# Patient Record
Sex: FEMALE | Race: BLACK OR AFRICAN AMERICAN | URBAN - METROPOLITAN AREA
[De-identification: names, ages, dates, MRNs, and addresses within clinical notes are randomized per-mention and may not be internally consistent; named-entity substitution may affect disease eponyms.]

---

## 2020-07-07 ENCOUNTER — OFFICE VISIT (OUTPATIENT)
Dept: URBAN - METROPOLITAN AREA TELEHEALTH 2 | Facility: TELEHEALTH | Age: 70
End: 2020-07-07
Payer: MEDICARE

## 2020-07-07 ENCOUNTER — LAB OUTSIDE AN ENCOUNTER (OUTPATIENT)
Dept: URBAN - METROPOLITAN AREA TELEHEALTH 2 | Facility: TELEHEALTH | Age: 70
End: 2020-07-07

## 2020-07-07 DIAGNOSIS — K21.0 GASTROESOPHAGEAL REFLUX DISEASE WITH ESOPHAGITIS: ICD-10-CM

## 2020-07-07 DIAGNOSIS — R14.0 BLOATING: ICD-10-CM

## 2020-07-07 DIAGNOSIS — K57.90 DIVERTICULOSIS: ICD-10-CM

## 2020-07-07 DIAGNOSIS — K44.9 HIATAL HERNIA: ICD-10-CM

## 2020-07-07 DIAGNOSIS — K57.30 ACQUIRED DIVERTICULOSIS OF COLON: ICD-10-CM

## 2020-07-07 PROCEDURE — 1036F TOBACCO NON-USER: CPT | Performed by: INTERNAL MEDICINE

## 2020-07-07 PROCEDURE — 99214 OFFICE O/P EST MOD 30 MIN: CPT | Performed by: INTERNAL MEDICINE

## 2020-07-07 PROCEDURE — 3017F COLORECTAL CA SCREEN DOC REV: CPT | Performed by: INTERNAL MEDICINE

## 2020-07-07 PROCEDURE — G8428 CUR MEDS NOT DOCUMENT: HCPCS | Performed by: INTERNAL MEDICINE

## 2020-07-07 PROCEDURE — G9903 PT SCRN TBCO ID AS NON USER: HCPCS | Performed by: INTERNAL MEDICINE

## 2020-07-07 PROCEDURE — G8419 CALC BMI OUT NRM PARAM NOF/U: HCPCS | Performed by: INTERNAL MEDICINE

## 2020-07-07 RX ORDER — DICYCLOMINE HYDROCHLORIDE 10 MG/1
1 TABLET CAPSULE ORAL THREE TIMES A DAY
Qty: 90 | Refills: 1 | OUTPATIENT
Start: 2020-07-07 | End: 2020-09-05

## 2020-07-07 RX ORDER — AMLODIPINE BESYLATE 5 MG/1
1 TABLET TABLET ORAL ONCE A DAY
Status: ACTIVE | COMMUNITY

## 2020-07-07 RX ORDER — ESOMEPRAZOLE MAGNESIUM 40 MG
TAKE 1 CAPSULE (40 MG) BY ORAL ROUTE ONCE DAILY CAPSULE,DELAYED RELEASE (ENTERIC COATED) ORAL 1
Qty: 0 | Refills: 0 | Status: DISCONTINUED | COMMUNITY
Start: 1900-01-01

## 2020-07-07 RX ORDER — SIMVASTATIN 40 MG/1
TAKE 1 TABLET (40 MG) BY ORAL ROUTE ONCE DAILY IN THE EVENING TABLET, FILM COATED ORAL 1
Qty: 0 | Refills: 0 | Status: DISCONTINUED | COMMUNITY
Start: 1900-01-01

## 2020-07-07 RX ORDER — PANTOPRAZOLE SODIUM 40 MG/1
1 TABLET TABLET, DELAYED RELEASE ORAL ONCE A DAY
Qty: 90 | Refills: 1 | OUTPATIENT
Start: 2020-07-07

## 2020-07-07 NOTE — HPI-TODAY'S VISIT:
69 years old pleasant -American female was seen by me at [facility] today.  She denies have any abdominal pain but does complain of bloating she has symptoms are getting worse from past few months she takes the Gas-X without any success.  She does complain of heartburn and indigestion which is not also under control she stopped taking the Nexium many years ago.  She did have a weight gain.  She also history of hiatal hernia she thinks that was bothering her now.  Denies of any melena or hematochezia or NSAID use.  She does drink lots of coffee.

## 2020-07-30 ENCOUNTER — OFFICE VISIT (OUTPATIENT)
Dept: URBAN - METROPOLITAN AREA SURGERY CENTER 13 | Facility: SURGERY CENTER | Age: 70
End: 2020-07-30
Payer: MEDICARE

## 2020-07-30 ENCOUNTER — CLAIMS CREATED FROM THE CLAIM WINDOW (OUTPATIENT)
Dept: URBAN - METROPOLITAN AREA CLINIC 4 | Facility: CLINIC | Age: 70
End: 2020-07-30
Payer: MEDICARE

## 2020-07-30 DIAGNOSIS — K31.89 ACQUIRED DEFORMITY OF PYLORUS: ICD-10-CM

## 2020-07-30 DIAGNOSIS — K21.9 ACID REFLUX: ICD-10-CM

## 2020-07-30 DIAGNOSIS — K21.0 GASTRO-ESOPHAGEAL REFLUX DISEASE WITH ESOPHAGITIS: ICD-10-CM

## 2020-07-30 DIAGNOSIS — K29.60 OTHER GASTRITIS WITHOUT BLEEDING: ICD-10-CM

## 2020-07-30 PROCEDURE — 43239 EGD BIOPSY SINGLE/MULTIPLE: CPT | Performed by: INTERNAL MEDICINE

## 2020-07-30 PROCEDURE — 88305 TISSUE EXAM BY PATHOLOGIST: CPT | Performed by: PATHOLOGY

## 2020-07-30 PROCEDURE — G8907 PT DOC NO EVENTS ON DISCHARG: HCPCS | Performed by: INTERNAL MEDICINE

## 2020-07-30 PROCEDURE — 88312 SPECIAL STAINS GROUP 1: CPT | Performed by: PATHOLOGY

## 2020-07-30 RX ORDER — AMLODIPINE BESYLATE 5 MG/1
1 TABLET TABLET ORAL ONCE A DAY
Status: ACTIVE | COMMUNITY

## 2020-07-30 RX ORDER — PANTOPRAZOLE SODIUM 40 MG/1
1 TABLET TABLET, DELAYED RELEASE ORAL ONCE A DAY
Qty: 90 | Refills: 1 | Status: ACTIVE | COMMUNITY
Start: 2020-07-07

## 2020-07-30 RX ORDER — SUCRALFATE 1 G/1
1 TABLET ON AN EMPTY STOMACH TABLET ORAL TWICE A DAY
Qty: 60 TABLET | Refills: 1 | OUTPATIENT
Start: 2020-07-30 | End: 2020-09-28

## 2020-07-30 RX ORDER — OMEPRAZOLE 40 MG/1
1 CAPSULE 30 MINUTES BEFORE MORNING MEAL CAPSULE, DELAYED RELEASE ORAL ONCE A DAY
Qty: 90 | Refills: 1 | OUTPATIENT
Start: 2020-07-30

## 2020-07-30 RX ORDER — DICYCLOMINE HYDROCHLORIDE 10 MG/1
1 TABLET CAPSULE ORAL THREE TIMES A DAY
Qty: 90 | Refills: 1 | Status: ACTIVE | COMMUNITY
Start: 2020-07-07 | End: 2020-09-05

## 2021-06-22 ENCOUNTER — WEB ENCOUNTER (OUTPATIENT)
Dept: URBAN - METROPOLITAN AREA CLINIC 82 | Facility: CLINIC | Age: 71
End: 2021-06-22

## 2021-06-22 ENCOUNTER — OFFICE VISIT (OUTPATIENT)
Dept: URBAN - METROPOLITAN AREA CLINIC 82 | Facility: CLINIC | Age: 71
End: 2021-06-22
Payer: MEDICARE

## 2021-06-22 DIAGNOSIS — K21.00 CHRONIC REFLUX ESOPHAGITIS: ICD-10-CM

## 2021-06-22 DIAGNOSIS — B17.10 HEPATITIS C: ICD-10-CM

## 2021-06-22 DIAGNOSIS — K44.9 HIATAL HERNIA: ICD-10-CM

## 2021-06-22 DIAGNOSIS — K58.8 OTHER IRRITABLE BOWEL SYNDROME: ICD-10-CM

## 2021-06-22 PROCEDURE — G8417 CALC BMI ABV UP PARAM F/U: HCPCS | Performed by: INTERNAL MEDICINE

## 2021-06-22 PROCEDURE — G8427 DOCREV CUR MEDS BY ELIG CLIN: HCPCS | Performed by: INTERNAL MEDICINE

## 2021-06-22 PROCEDURE — 1036F TOBACCO NON-USER: CPT | Performed by: INTERNAL MEDICINE

## 2021-06-22 PROCEDURE — 99214 OFFICE O/P EST MOD 30 MIN: CPT | Performed by: INTERNAL MEDICINE

## 2021-06-22 PROCEDURE — G9903 PT SCRN TBCO ID AS NON USER: HCPCS | Performed by: INTERNAL MEDICINE

## 2021-06-22 RX ORDER — AMLODIPINE BESYLATE 5 MG/1
1 TABLET TABLET ORAL ONCE A DAY
Status: ACTIVE | COMMUNITY

## 2021-06-22 RX ORDER — OMEPRAZOLE 40 MG/1
1 CAPSULE 30 MINUTES BEFORE MORNING MEAL CAPSULE, DELAYED RELEASE ORAL ONCE A DAY
Qty: 90 | Refills: 1 | Status: ACTIVE | COMMUNITY
Start: 2020-07-30

## 2021-06-22 RX ORDER — DICYCLOMINE HYDROCHLORIDE 10 MG/1
1 TABLET CAPSULE ORAL THREE TIMES A DAY
Qty: 90 | Refills: 1 | OUTPATIENT
Start: 2021-06-22 | End: 2021-08-21

## 2021-06-22 RX ORDER — PANTOPRAZOLE SODIUM 40 MG/1
1 TABLET TABLET, DELAYED RELEASE ORAL ONCE A DAY
Qty: 90 | Refills: 1 | Status: DISCONTINUED | COMMUNITY
Start: 2020-07-07

## 2021-06-22 NOTE — HPI-OTHER HISTORIES
The patient is a 67 year old /Black female, who presents on referral from [facility], for a gastroenterology evaluation for abdominal pain. A copy of this document will be sent to the referring provider.   12/23/2016 The patient has described the pain as mild to moderate and dull and colicky occurring intermittently, and lasts minutes at a time. The pain has remained unchanged since it started. The location of the pain is diffusely in the abdomen and has been present for the past 3 months. The pain is improved by no particular treatment and is aggravated by no particular treatment. The pain is not reproducible with palpation over the area.  Patient has no history of GERD and abdominal pain.  The patient admits to no other complaints.    09/07/2017 Patient still complains of abdominal pain and bloating. She also complains of acid reflux for which she takes Nexium with occasional breakthrough symptoms. Patient also takes gas-x. She did not fill Levsin. She admits to a significant amount of stress at the moment. Her last colonoscopy was in 2013. Denies constipation.

## 2021-06-22 NOTE — HPI-TODAY'S VISIT:
07/07/2020 69 years old pleasant -American female was seen by me at [facility] today.  She denies have any abdominal pain but does complain of bloating she has symptoms are getting worse from past few months she takes the Gas-X without any success.  She does complain of heartburn and indigestion which is not also under control she stopped taking the Nexium many years ago.  She did have a weight gain.  She also history of hiatal hernia she thinks that was bothering her now.  Denies of any melena or hematochezia or NSAID use.  She does drink lots of coffee.  06/22/2021 Patient presents for a follow up office visit for evaluation of GERD. EGD on 07/30/2020 showed esophagitis, gastritis, small hiatal hernia. Biopsy showed gastritis, no H pylori. Patient is still experiencing intermittent acid reflux with flare ups that cause severe epigastric pain and bloating. She has been routinely taking omeprazole 40mg and sucralfate which controls her symptoms somewhat. She denies any constipation or diarrhea. She has been exercising and trying to lose weight. She currently avoids beans, tomatoes, cabbage, and broccoli in her diet. Patient admits she drinks coffee but she has been trying to decrease the intake per day. Her previous colonoscopy was 2 years ago. Patient admits that she experiences anxiety and she does not sleep well. Patient is also requesting Hep C labs as labs from her PCP are returning positive.

## 2021-06-24 LAB
HCV GENOTYPE: (no result)
HCV LOG10: (no result)
HEPATITIS C QUANTITATION: (no result)
TEST INFORMATION:: (no result)

## 2021-07-13 ENCOUNTER — OFFICE VISIT (OUTPATIENT)
Dept: URBAN - METROPOLITAN AREA CLINIC 82 | Facility: CLINIC | Age: 71
End: 2021-07-13

## 2021-07-13 VITALS — HEIGHT: 64 IN

## 2021-07-13 RX ORDER — DICYCLOMINE HYDROCHLORIDE 10 MG/1
1 TABLET CAPSULE ORAL THREE TIMES A DAY
Qty: 90 | Refills: 1 | Status: ACTIVE | COMMUNITY
Start: 2021-06-22 | End: 2021-08-21

## 2021-07-13 RX ORDER — AMLODIPINE BESYLATE 5 MG/1
1 TABLET TABLET ORAL ONCE A DAY
Status: ACTIVE | COMMUNITY

## 2021-07-13 RX ORDER — OMEPRAZOLE 40 MG/1
1 CAPSULE 30 MINUTES BEFORE MORNING MEAL CAPSULE, DELAYED RELEASE ORAL ONCE A DAY
Qty: 90 | Refills: 1 | Status: ACTIVE | COMMUNITY
Start: 2020-07-30

## 2021-08-18 ENCOUNTER — OFFICE VISIT (OUTPATIENT)
Dept: URBAN - METROPOLITAN AREA CLINIC 82 | Facility: CLINIC | Age: 71
End: 2021-08-18

## 2021-08-24 ENCOUNTER — ERX REFILL RESPONSE (OUTPATIENT)
Dept: URBAN - METROPOLITAN AREA CLINIC 82 | Facility: CLINIC | Age: 71
End: 2021-08-24

## 2021-08-24 RX ORDER — OMEPRAZOLE 40 MG/1
1 CAPSULE 30 MINUTES BEFORE MORNING MEAL CAPSULE, DELAYED RELEASE ORAL ONCE A DAY
Qty: 90 | Refills: 1 | OUTPATIENT

## 2021-08-24 RX ORDER — OMEPRAZOLE 40 MG/1
TAKE 1 CAPSULE BY MOUTH EVERY DAY 30 MINUTES BEFORE BREAKFAST CAPSULE, DELAYED RELEASE ORAL
Qty: 90 CAPSULE | Refills: 1 | OUTPATIENT

## 2021-10-14 ENCOUNTER — LAB OUTSIDE AN ENCOUNTER (OUTPATIENT)
Dept: URBAN - METROPOLITAN AREA CLINIC 115 | Facility: CLINIC | Age: 71
End: 2021-10-14

## 2021-10-14 ENCOUNTER — OFFICE VISIT (OUTPATIENT)
Dept: URBAN - METROPOLITAN AREA CLINIC 115 | Facility: CLINIC | Age: 71
End: 2021-10-14
Payer: MEDICARE

## 2021-10-14 DIAGNOSIS — K44.9 HIATAL HERNIA: ICD-10-CM

## 2021-10-14 DIAGNOSIS — K21.0 GASTROESOPHAGEAL REFLUX DISEASE WITH ESOPHAGITIS: ICD-10-CM

## 2021-10-14 DIAGNOSIS — K58.9 IBS (IRRITABLE BOWEL SYNDROME): ICD-10-CM

## 2021-10-14 DIAGNOSIS — R14.3 GAS AND BLOATING: ICD-10-CM

## 2021-10-14 DIAGNOSIS — B17.10 HEPATITIS C: ICD-10-CM

## 2021-10-14 PROCEDURE — G8427 DOCREV CUR MEDS BY ELIG CLIN: HCPCS | Performed by: INTERNAL MEDICINE

## 2021-10-14 PROCEDURE — 91065 BREATH HYDROGEN/METHANE TEST: CPT | Performed by: INTERNAL MEDICINE

## 2021-10-14 PROCEDURE — G9903 PT SCRN TBCO ID AS NON USER: HCPCS | Performed by: INTERNAL MEDICINE

## 2021-10-14 PROCEDURE — G8417 CALC BMI ABV UP PARAM F/U: HCPCS | Performed by: INTERNAL MEDICINE

## 2021-10-14 PROCEDURE — 99214 OFFICE O/P EST MOD 30 MIN: CPT | Performed by: INTERNAL MEDICINE

## 2021-10-14 RX ORDER — OMEPRAZOLE 40 MG/1
TAKE 1 CAPSULE BY MOUTH EVERY DAY 30 MINUTES BEFORE BREAKFAST CAPSULE, DELAYED RELEASE ORAL
Qty: 90 CAPSULE | Refills: 1 | Status: ACTIVE | COMMUNITY

## 2021-10-14 RX ORDER — AMLODIPINE BESYLATE 5 MG/1
1 TABLET TABLET ORAL ONCE A DAY
Status: ACTIVE | COMMUNITY

## 2021-10-14 NOTE — HPI-OTHER HISTORIES
The patient is a 67 year old /Black female, who presents on referral from [facility], for a gastroenterology evaluation for abdominal pain. A copy of this document will be sent to the referring provider.   12/23/2016 The patient has described the pain as mild to moderate and dull and colicky occurring intermittently, and lasts minutes at a time. The pain has remained unchanged since it started. The location of the pain is diffusely in the abdomen and has been present for the past 3 months. The pain is improved by no particular treatment and is aggravated by no particular treatment. The pain is not reproducible with palpation over the area.  Patient has no history of GERD and abdominal pain.  The patient admits to no other complaints.    09/07/2017 Patient still complains of abdominal pain and bloating. She also complains of acid reflux for which she takes Nexium with occasional breakthrough symptoms. Patient also takes gas-x. She did not fill Levsin. She admits to a significant amount of stress at the moment. Her last colonoscopy was in 2013. Denies constipation.   Influenza - 10/2020

## 2021-10-14 NOTE — HPI-TODAY'S VISIT:
07/07/2020 69 years old pleasant -American female was seen by me at [facility] today.  She denies have any abdominal pain but does complain of bloating she has symptoms are getting worse from past few months she takes the Gas-X without any success.  She does complain of heartburn and indigestion which is not also under control she stopped taking the Nexium many years ago.  She did have a weight gain.  She also history of hiatal hernia she thinks that was bothering her now.  Denies of any melena or hematochezia or NSAID use.  She does drink lots of coffee.  06/22/2021 Patient presents for a follow up office visit for evaluation of GERD. EGD on 07/30/2020 showed esophagitis, gastritis, small hiatal hernia. Biopsy showed gastritis, no H pylori. Patient is still experiencing intermittent acid reflux with flare ups that cause severe epigastric pain and bloating. She has been routinely taking omeprazole 40mg and sucralfate which controls her symptoms somewhat. She denies any constipation or diarrhea. She has been exercising and trying to lose weight. She currently avoids beans, tomatoes, cabbage, and broccoli in her diet. Patient admits she drinks coffee but she has been trying to decrease the intake per day. Her previous colonoscopy was 2 years ago. Patient admits that she experiences anxiety and she does not sleep well. Patient is also requesting Hep C labs as labs from her PCP are returning positive.   10/14/2021 Patient presents for a follow up office visit. Labs on 6/22/2021 showed Hep C was negative. Patient presently c/o constant acid reflux and epigastric bloating. Omeprazole does help her symptoms. Dicyclomine is ineffective. Symptoms of reflux and IBS have been long term since 2004 as per her. Denies any constipation or diarrhea. She denies any history of thyroid conditions.

## 2021-10-15 ENCOUNTER — TELEPHONE ENCOUNTER (OUTPATIENT)
Dept: URBAN - METROPOLITAN AREA CLINIC 92 | Facility: CLINIC | Age: 71
End: 2021-10-15

## 2021-10-15 LAB
A/G RATIO: 1.5
ALBUMIN: 4.2
ALKALINE PHOSPHATASE: 90
ALT (SGPT): 13
AST (SGOT): 14
BASO (ABSOLUTE): 0
BASOS: 1
BILIRUBIN, TOTAL: 0.3
BUN/CREATININE RATIO: 17
BUN: 16
CALCIUM: 9.5
CARBON DIOXIDE, TOTAL: 25
CHLORIDE: 104
CREATININE: 0.96
EGFR IF AFRICN AM: 70
EGFR IF NONAFRICN AM: 61
EOS (ABSOLUTE): 0.2
EOS: 3
GLOBULIN, TOTAL: 2.8
GLUCOSE: 116
HEMATOCRIT: 46.7
HEMATOLOGY COMMENTS:: (no result)
HEMOGLOBIN: 14.2
IMMATURE CELLS: (no result)
IMMATURE GRANS (ABS): 0
IMMATURE GRANULOCYTES: 1
LYMPHS (ABSOLUTE): 2.2
LYMPHS: 38
MCH: 23.9
MCHC: 30.4
MCV: 79
MONOCYTES(ABSOLUTE): 0.4
MONOCYTES: 8
NEUTROPHILS (ABSOLUTE): 2.9
NEUTROPHILS: 49
NRBC: (no result)
PLATELETS: 241
POTASSIUM: 5
PROTEIN, TOTAL: 7
RBC: 5.94
RDW: 15.8
SODIUM: 142
T4,FREE(DIRECT): 1.14
TSH: 2.39
WBC: 5.7

## 2021-10-18 ENCOUNTER — TELEPHONE ENCOUNTER (OUTPATIENT)
Dept: URBAN - METROPOLITAN AREA CLINIC 82 | Facility: CLINIC | Age: 71
End: 2021-10-18

## 2021-10-21 ENCOUNTER — ERX REFILL RESPONSE (OUTPATIENT)
Dept: URBAN - METROPOLITAN AREA CLINIC 82 | Facility: CLINIC | Age: 71
End: 2021-10-21

## 2021-10-21 RX ORDER — HYOSCYAMINE SULFATE 0.12 MG/1
TAKE 1 TABLET BY MOUTH THREE TIMES DAILY TABLET ORAL
Qty: 270 TABLET | Refills: 0 | OUTPATIENT

## 2021-10-21 RX ORDER — HYOSCYAMINE SULFATE 0.12 MG/1
TAKE 1 TABLET BY MOUTH THREE TIMES DAILY TABLET ORAL
Qty: 270 TABLET | Refills: 1 | OUTPATIENT

## 2021-11-01 ENCOUNTER — TELEPHONE ENCOUNTER (OUTPATIENT)
Dept: URBAN - METROPOLITAN AREA CLINIC 82 | Facility: CLINIC | Age: 71
End: 2021-11-01

## 2021-11-17 ENCOUNTER — TELEPHONE ENCOUNTER (OUTPATIENT)
Dept: URBAN - METROPOLITAN AREA CLINIC 92 | Facility: CLINIC | Age: 71
End: 2021-11-17

## 2021-11-17 RX ORDER — AMLODIPINE BESYLATE 5 MG/1
1 TABLET TABLET ORAL ONCE A DAY
Status: ACTIVE | COMMUNITY

## 2021-11-17 RX ORDER — HYOSCYAMINE SULFATE 0.12 MG/1
TAKE 1 TABLET BY MOUTH THREE TIMES DAILY TABLET ORAL
Qty: 270 TABLET | Refills: 1 | Status: ACTIVE | COMMUNITY

## 2021-11-17 RX ORDER — OMEPRAZOLE 40 MG/1
TAKE 1 CAPSULE BY MOUTH EVERY DAY 30 MINUTES BEFORE BREAKFAST CAPSULE, DELAYED RELEASE ORAL
Qty: 90 CAPSULE | Refills: 1 | Status: ACTIVE | COMMUNITY

## 2021-11-17 RX ORDER — RIFAXIMIN 200 MG/1
2 TABLETS TABLET ORAL THREE TIMES A DAY
Qty: 60 | OUTPATIENT
Start: 2021-11-17 | End: 2021-11-27

## 2021-11-18 ENCOUNTER — OFFICE VISIT (OUTPATIENT)
Dept: URBAN - METROPOLITAN AREA CLINIC 115 | Facility: CLINIC | Age: 71
End: 2021-11-18

## 2021-11-19 ENCOUNTER — ERX REFILL RESPONSE (OUTPATIENT)
Dept: URBAN - METROPOLITAN AREA CLINIC 82 | Facility: CLINIC | Age: 71
End: 2021-11-19

## 2021-11-19 ENCOUNTER — OFFICE VISIT (OUTPATIENT)
Dept: URBAN - METROPOLITAN AREA CLINIC 82 | Facility: CLINIC | Age: 71
End: 2021-11-19
Payer: MEDICARE

## 2021-11-19 VITALS
HEART RATE: 84 BPM | TEMPERATURE: 97.1 F | HEIGHT: 64 IN | WEIGHT: 212.6 LBS | SYSTOLIC BLOOD PRESSURE: 133 MMHG | DIASTOLIC BLOOD PRESSURE: 86 MMHG | BODY MASS INDEX: 36.29 KG/M2

## 2021-11-19 DIAGNOSIS — B18.2 CARRIER OF VIRAL HEPATITIS C: ICD-10-CM

## 2021-11-19 DIAGNOSIS — K44.9 HIATAL HERNIA: ICD-10-CM

## 2021-11-19 DIAGNOSIS — R14.3 GAS AND BLOATING: ICD-10-CM

## 2021-11-19 DIAGNOSIS — K58.9 IBS (IRRITABLE BOWEL SYNDROME): ICD-10-CM

## 2021-11-19 PROCEDURE — G8417 CALC BMI ABV UP PARAM F/U: HCPCS | Performed by: INTERNAL MEDICINE

## 2021-11-19 PROCEDURE — 1036F TOBACCO NON-USER: CPT | Performed by: INTERNAL MEDICINE

## 2021-11-19 PROCEDURE — G9903 PT SCRN TBCO ID AS NON USER: HCPCS | Performed by: INTERNAL MEDICINE

## 2021-11-19 PROCEDURE — 99214 OFFICE O/P EST MOD 30 MIN: CPT | Performed by: INTERNAL MEDICINE

## 2021-11-19 PROCEDURE — G8427 DOCREV CUR MEDS BY ELIG CLIN: HCPCS | Performed by: INTERNAL MEDICINE

## 2021-11-19 RX ORDER — HYOSCYAMINE SULFATE 0.12 MG/1
1 TABLET AS NEEDED TABLET ORAL
Qty: 90 | Refills: 1 | OUTPATIENT

## 2021-11-19 RX ORDER — RIFAXIMIN 200 MG/1
2 TABLETS TABLET ORAL THREE TIMES A DAY
Qty: 60 | OUTPATIENT
Start: 2021-11-19 | End: 2021-11-29

## 2021-11-19 RX ORDER — HYOSCYAMINE SULFATE 0.12 MG/1
1 TABLET AS NEEDED TABLET ORAL
Qty: 90 | Refills: 1 | OUTPATIENT
Start: 2021-11-19 | End: 2022-01-18

## 2021-11-19 RX ORDER — HYOSCYAMINE SULFATE 0.12 MG/1
TAKE 1 TABLET BY MOUTH EVERY 8 HOURS AS NEEDED TABLET ORAL
Qty: 270 TABLET | Refills: 1 | OUTPATIENT

## 2021-11-19 RX ORDER — OMEPRAZOLE 40 MG/1
TAKE 1 CAPSULE BY MOUTH EVERY DAY 30 MINUTES BEFORE BREAKFAST CAPSULE, DELAYED RELEASE ORAL
Qty: 90 CAPSULE | Refills: 1 | Status: ACTIVE | COMMUNITY

## 2021-11-19 RX ORDER — AMLODIPINE BESYLATE 5 MG/1
1 TABLET TABLET ORAL ONCE A DAY
Status: ACTIVE | COMMUNITY

## 2021-11-19 RX ORDER — RIFAXIMIN 200 MG/1
2 TABLETS TABLET ORAL THREE TIMES A DAY
Qty: 60 | Status: ACTIVE | COMMUNITY
Start: 2021-11-17 | End: 2021-11-27

## 2021-11-19 RX ORDER — PANTOPRAZOLE SODIUM 40 MG/1
1 TABLET TABLET, DELAYED RELEASE ORAL ONCE A DAY
Qty: 90 | Refills: 1 | OUTPATIENT
Start: 2021-11-19

## 2021-11-19 RX ORDER — HYOSCYAMINE SULFATE 0.12 MG/1
TAKE 1 TABLET BY MOUTH THREE TIMES DAILY TABLET ORAL
Qty: 270 TABLET | Refills: 1 | Status: ACTIVE | COMMUNITY

## 2021-11-19 NOTE — HPI-TODAY'S VISIT:
07/07/2020 69 years old pleasant -American female was seen by me at [facility] today.  She denies have any abdominal pain but does complain of bloating she has symptoms are getting worse from past few months she takes the Gas-X without any success.  She does complain of heartburn and indigestion which is not also under control she stopped taking the Nexium many years ago.  She did have a weight gain.  She also history of hiatal hernia she thinks that was bothering her now.  Denies of any melena or hematochezia or NSAID use.  She does drink lots of coffee.  06/22/2021 Patient presents for a follow up office visit for evaluation of GERD. EGD on 07/30/2020 showed esophagitis, gastritis, small hiatal hernia. Biopsy showed gastritis, no H pylori. Patient is still experiencing intermittent acid reflux with flare ups that cause severe epigastric pain and bloating. She has been routinely taking omeprazole 40mg and sucralfate which controls her symptoms somewhat. She denies any constipation or diarrhea. She has been exercising and trying to lose weight. She currently avoids beans, tomatoes, cabbage, and broccoli in her diet. Patient admits she drinks coffee but she has been trying to decrease the intake per day. Her previous colonoscopy was 2 years ago. Patient admits that she experiences anxiety and she does not sleep well. Patient is also requesting Hep C labs as labs from her PCP are returning positive.   10/14/2021 Patient presents for a follow up office visit. Labs on 6/22/2021 showed Hep C was negative. Patient presently c/o constant acid reflux and epigastric bloating. Omeprazole does help her symptoms. Dicyclomine is ineffective. Symptoms of reflux and IBS have been long term since 2004 as per her. Denies any constipation or diarrhea. She denies any history of thyroid conditions.   11/19/2021 Patient presents for a follow up office visit. Labs done on 6/24/2021 did not reveal any signs of Hep C. Hydrogen breath test completed on 11/16/2021 was positive for bacterial overgrowth. CT scan  of the abdomen pelvis on 10/28/21 showed left sided diverticulosis and stool within the entire colon. She has a bowel movement qd. Denies constipation. Dicyclomine did not improved her bloating and gas pain in the upper abdomen. Her pain is alleviated with gas. She has been scared to eat her normal movements. She takes Pantoprazole before meals as needed. Her last colonoscopy was on 4/8/2019.

## 2021-12-17 ENCOUNTER — OFFICE VISIT (OUTPATIENT)
Dept: URBAN - METROPOLITAN AREA CLINIC 82 | Facility: CLINIC | Age: 71
End: 2021-12-17

## 2021-12-29 ENCOUNTER — OFFICE VISIT (OUTPATIENT)
Dept: URBAN - METROPOLITAN AREA CLINIC 82 | Facility: CLINIC | Age: 71
End: 2021-12-29
Payer: MEDICARE

## 2021-12-29 DIAGNOSIS — R14.3 GAS AND BLOATING: ICD-10-CM

## 2021-12-29 DIAGNOSIS — K44.9 HIATAL HERNIA: ICD-10-CM

## 2021-12-29 DIAGNOSIS — B17.10 HEPATITIS C: ICD-10-CM

## 2021-12-29 DIAGNOSIS — K58.8 OTHER IRRITABLE BOWEL SYNDROME: ICD-10-CM

## 2021-12-29 DIAGNOSIS — K21.00 ALKALINE REFLUX ESOPHAGITIS: ICD-10-CM

## 2021-12-29 PROCEDURE — 99214 OFFICE O/P EST MOD 30 MIN: CPT | Performed by: INTERNAL MEDICINE

## 2021-12-29 PROCEDURE — G8417 CALC BMI ABV UP PARAM F/U: HCPCS | Performed by: INTERNAL MEDICINE

## 2021-12-29 PROCEDURE — G8427 DOCREV CUR MEDS BY ELIG CLIN: HCPCS | Performed by: INTERNAL MEDICINE

## 2021-12-29 PROCEDURE — G9903 PT SCRN TBCO ID AS NON USER: HCPCS | Performed by: INTERNAL MEDICINE

## 2021-12-29 RX ORDER — AMLODIPINE BESYLATE 5 MG/1
1 TABLET TABLET ORAL ONCE A DAY
Status: ACTIVE | COMMUNITY

## 2021-12-29 RX ORDER — OMEPRAZOLE 40 MG/1
1 CAPSULE 30 MINUTES BEFORE MORNING MEAL CAPSULE, DELAYED RELEASE ORAL ONCE A DAY
Qty: 90 | Refills: 1 | OUTPATIENT
Start: 2021-12-29

## 2021-12-29 RX ORDER — PANTOPRAZOLE SODIUM 40 MG/1
1 TABLET TABLET, DELAYED RELEASE ORAL ONCE A DAY
Qty: 90 | Refills: 1 | Status: ACTIVE | COMMUNITY
Start: 2021-11-19

## 2021-12-29 RX ORDER — HYOSCYAMINE SULFATE 0.12 MG/1
TAKE 1 TABLET BY MOUTH EVERY 8 HOURS AS NEEDED TABLET ORAL
Qty: 270 TABLET | Refills: 1 | Status: ACTIVE | COMMUNITY

## 2021-12-29 RX ORDER — RIFAXIMIN 200 MG/1
2 TABLETS TABLET ORAL THREE TIMES A DAY
Qty: 60 | OUTPATIENT
Start: 2021-12-29 | End: 2022-01-08

## 2021-12-29 RX ORDER — OMEPRAZOLE 40 MG/1
TAKE 1 CAPSULE BY MOUTH EVERY DAY 30 MINUTES BEFORE BREAKFAST CAPSULE, DELAYED RELEASE ORAL
Qty: 90 CAPSULE | Refills: 1 | Status: ACTIVE | COMMUNITY

## 2021-12-29 RX ORDER — DICYCLOMINE HYDROCHLORIDE 10 MG/1
1 TABLET CAPSULE ORAL THREE TIMES A DAY
Qty: 90 | Refills: 1 | OUTPATIENT
Start: 2021-12-29 | End: 2022-02-27

## 2021-12-29 NOTE — HPI-TODAY'S VISIT:
07/07/2020 69 years old pleasant -American female was seen by me at [facility] today.  She denies have any abdominal pain but does complain of bloating she has symptoms are getting worse from past few months she takes the Gas-X without any success.  She does complain of heartburn and indigestion which is not also under control she stopped taking the Nexium many years ago.  She did have a weight gain.  She also history of hiatal hernia she thinks that was bothering her now.  Denies of any melena or hematochezia or NSAID use.  She does drink lots of coffee.  06/22/2021 Patient presents for a follow up office visit for evaluation of GERD. EGD on 07/30/2020 showed esophagitis, gastritis, small hiatal hernia. Biopsy showed gastritis, no H pylori. Patient is still experiencing intermittent acid reflux with flare ups that cause severe epigastric pain and bloating. She has been routinely taking omeprazole 40mg and sucralfate which controls her symptoms somewhat. She denies any constipation or diarrhea. She has been exercising and trying to lose weight. She currently avoids beans, tomatoes, cabbage, and broccoli in her diet. Patient admits she drinks coffee but she has been trying to decrease the intake per day. Her previous colonoscopy was 2 years ago. Patient admits that she experiences anxiety and she does not sleep well. Patient is also requesting Hep C labs as labs from her PCP are returning positive.   10/14/2021 Patient presents for a follow up office visit. Labs on 6/22/2021 showed Hep C was negative. Patient presently c/o constant acid reflux and epigastric bloating. Omeprazole does help her symptoms. Dicyclomine is ineffective. Symptoms of reflux and IBS have been long term since 2004 as per her. Denies any constipation or diarrhea. She denies any history of thyroid conditions.   11/19/2021 Patient presents for a follow up office visit. Labs done on 6/24/2021 did not reveal any signs of Hep C. Hydrogen breath test completed on 11/16/2021 was positive for bacterial overgrowth. CT scan  of the abdomen pelvis on 10/28/21 showed left sided diverticulosis and stool within the entire colon. She has a bowel movement qd. Denies constipation. Dicyclomine did not improved her bloating and gas pain in the upper abdomen. Her pain is alleviated with gas. She has been scared to eat her normal movements. She takes Pantoprazole before meals as needed. Her last colonoscopy was on 4/8/2019.   12/29/2021 Colonoscopy on 4/8/2019 showed left sided diverticulosis and polyp in the rectum. Patient finished Xifaxan, but she had recurrence of bloating symptoms and abdominal pain on 12/25, especially in epigastric area. She denies any changes to medications or use of natural supplements. She has been off of Omeprazole as symptoms were temporarily improved. Patient is not diabetic. She states Levsin and Dicyclomine were ineffective for her in the past. She denies any constipation or diarrhea now. Patient reports dietary changes and regular exercise have helped her lose weight.  She denies any stress or anxiety.

## 2022-01-26 ENCOUNTER — OFFICE VISIT (OUTPATIENT)
Dept: URBAN - METROPOLITAN AREA CLINIC 82 | Facility: CLINIC | Age: 72
End: 2022-01-26

## 2022-01-26 RX ORDER — DICYCLOMINE HYDROCHLORIDE 10 MG/1
1 TABLET CAPSULE ORAL THREE TIMES A DAY
Qty: 90 | Refills: 1 | Status: ACTIVE | COMMUNITY
Start: 2021-12-29 | End: 2022-02-27

## 2022-01-26 RX ORDER — OMEPRAZOLE 40 MG/1
TAKE 1 CAPSULE BY MOUTH EVERY DAY 30 MINUTES BEFORE BREAKFAST CAPSULE, DELAYED RELEASE ORAL
Qty: 90 CAPSULE | Refills: 1 | Status: ACTIVE | COMMUNITY

## 2022-01-26 RX ORDER — OMEPRAZOLE 40 MG/1
1 CAPSULE 30 MINUTES BEFORE MORNING MEAL CAPSULE, DELAYED RELEASE ORAL ONCE A DAY
Qty: 90 | Refills: 1 | Status: ACTIVE | COMMUNITY
Start: 2021-12-29

## 2022-01-26 RX ORDER — PANTOPRAZOLE SODIUM 40 MG/1
1 TABLET TABLET, DELAYED RELEASE ORAL ONCE A DAY
Qty: 90 | Refills: 1 | Status: ACTIVE | COMMUNITY
Start: 2021-11-19

## 2022-01-26 RX ORDER — AMLODIPINE BESYLATE 5 MG/1
1 TABLET TABLET ORAL ONCE A DAY
Status: ACTIVE | COMMUNITY

## 2022-01-26 RX ORDER — HYOSCYAMINE SULFATE 0.12 MG/1
TAKE 1 TABLET BY MOUTH EVERY 8 HOURS AS NEEDED TABLET ORAL
Qty: 270 TABLET | Refills: 1 | Status: ACTIVE | COMMUNITY

## 2022-03-10 ENCOUNTER — OFFICE VISIT (OUTPATIENT)
Dept: URBAN - METROPOLITAN AREA CLINIC 115 | Facility: CLINIC | Age: 72
End: 2022-03-10
Payer: MEDICARE

## 2022-03-10 ENCOUNTER — DASHBOARD ENCOUNTERS (OUTPATIENT)
Age: 72
End: 2022-03-10

## 2022-03-10 ENCOUNTER — LAB OUTSIDE AN ENCOUNTER (OUTPATIENT)
Dept: URBAN - METROPOLITAN AREA CLINIC 115 | Facility: CLINIC | Age: 72
End: 2022-03-10

## 2022-03-10 DIAGNOSIS — K63.89 BACTERIAL OVERGROWTH SYNDROME: ICD-10-CM

## 2022-03-10 DIAGNOSIS — K58.9 IBS (IRRITABLE BOWEL SYNDROME): ICD-10-CM

## 2022-03-10 DIAGNOSIS — K21.9 ACID REFLUX: ICD-10-CM

## 2022-03-10 DIAGNOSIS — B17.10 HEPATITIS C: ICD-10-CM

## 2022-03-10 DIAGNOSIS — K44.9 HIATAL HERNIA: ICD-10-CM

## 2022-03-10 PROBLEM — 266433003 GASTROESOPHAGEAL REFLUX DISEASE WITH ESOPHAGITIS: Status: ACTIVE | Noted: 2021-06-22

## 2022-03-10 PROBLEM — 10743008 IRRITABLE BOWEL SYNDROME: Status: ACTIVE | Noted: 2021-06-22

## 2022-03-10 PROBLEM — 271832001 FLATULENCE, ERUCTATION AND GAS PAIN: Status: ACTIVE | Noted: 2022-03-10

## 2022-03-10 PROBLEM — 50711007 HEPATITIS C: Status: ACTIVE | Noted: 2021-10-14

## 2022-03-10 PROCEDURE — 1036F TOBACCO NON-USER: CPT | Performed by: INTERNAL MEDICINE

## 2022-03-10 PROCEDURE — 99214 OFFICE O/P EST MOD 30 MIN: CPT | Performed by: INTERNAL MEDICINE

## 2022-03-10 PROCEDURE — G8417 CALC BMI ABV UP PARAM F/U: HCPCS | Performed by: INTERNAL MEDICINE

## 2022-03-10 PROCEDURE — G9903 PT SCRN TBCO ID AS NON USER: HCPCS | Performed by: INTERNAL MEDICINE

## 2022-03-10 PROCEDURE — G8427 DOCREV CUR MEDS BY ELIG CLIN: HCPCS | Performed by: INTERNAL MEDICINE

## 2022-03-10 RX ORDER — AMITRIPTYLINE HYDROCHLORIDE 10 MG/1
1 TABLET AT BEDTIME TABLET, FILM COATED ORAL ONCE A DAY
Qty: 30 | OUTPATIENT
Start: 2022-03-10

## 2022-03-10 RX ORDER — HYOSCYAMINE SULFATE 0.12 MG/1
TAKE 1 TABLET BY MOUTH EVERY 8 HOURS AS NEEDED TABLET ORAL
Qty: 270 TABLET | Refills: 1 | Status: ACTIVE | COMMUNITY

## 2022-03-10 RX ORDER — AMLODIPINE BESYLATE 5 MG/1
1 TABLET TABLET ORAL ONCE A DAY
Status: ACTIVE | COMMUNITY

## 2022-03-10 RX ORDER — OMEPRAZOLE 40 MG/1
1 CAPSULE 30 MINUTES BEFORE MORNING MEAL CAPSULE, DELAYED RELEASE ORAL ONCE A DAY
Qty: 90 | Refills: 1 | Status: ACTIVE | COMMUNITY
Start: 2021-12-29

## 2022-03-10 RX ORDER — OMEPRAZOLE 40 MG/1
TAKE 1 CAPSULE BY MOUTH EVERY DAY 30 MINUTES BEFORE BREAKFAST CAPSULE, DELAYED RELEASE ORAL
Qty: 90 CAPSULE | Refills: 1 | Status: ACTIVE | COMMUNITY

## 2022-03-10 RX ORDER — PANTOPRAZOLE SODIUM 40 MG/1
1 TABLET TABLET, DELAYED RELEASE ORAL ONCE A DAY
Qty: 90 | Refills: 1 | Status: ACTIVE | COMMUNITY
Start: 2021-11-19

## 2022-03-10 NOTE — HPI-TODAY'S VISIT:
07/07/2020 69 years old pleasant -American female was seen by me at [facility] today.  She denies have any abdominal pain but does complain of bloating she has symptoms are getting worse from past few months she takes the Gas-X without any success.  She does complain of heartburn and indigestion which is not also under control she stopped taking the Nexium many years ago.  She did have a weight gain.  She also history of hiatal hernia she thinks that was bothering her now.  Denies of any melena or hematochezia or NSAID use.  She does drink lots of coffee.  06/22/2021 Patient presents for a follow up office visit for evaluation of GERD. EGD on 07/30/2020 showed esophagitis, gastritis, small hiatal hernia. Biopsy showed gastritis, no H pylori. Patient is still experiencing intermittent acid reflux with flare ups that cause severe epigastric pain and bloating. She has been routinely taking omeprazole 40mg and sucralfate which controls her symptoms somewhat. She denies any constipation or diarrhea. She has been exercising and trying to lose weight. She currently avoids beans, tomatoes, cabbage, and broccoli in her diet. Patient admits she drinks coffee but she has been trying to decrease the intake per day. Her previous colonoscopy was 2 years ago. Patient admits that she experiences anxiety and she does not sleep well. Patient is also requesting Hep C labs as labs from her PCP are returning positive.   10/14/2021 Patient presents for a follow up office visit. Labs on 6/22/2021 showed Hep C was negative. Patient presently c/o constant acid reflux and epigastric bloating. Omeprazole does help her symptoms. Dicyclomine is ineffective. Symptoms of reflux and IBS have been long term since 2004 as per her. Denies any constipation or diarrhea. She denies any history of thyroid conditions.   11/19/2021 Patient presents for a follow up office visit. Labs done on 6/24/2021 did not reveal any signs of Hep C. Hydrogen breath test completed on 11/16/2021 was positive for bacterial overgrowth. CT scan  of the abdomen pelvis on 10/28/21 showed left sided diverticulosis and stool within the entire colon. She has a bowel movement qd. Denies constipation. Dicyclomine did not improved her bloating and gas pain in the upper abdomen. Her pain is alleviated with gas. She has been scared to eat her normal movements. She takes Pantoprazole before meals as needed. Her last colonoscopy was on 4/8/2019.   12/29/2021 Colonoscopy on 4/8/2019 showed left sided diverticulosis and polyp in the rectum. Patient finished Xifaxan, but she had recurrence of bloating symptoms and abdominal pain on 12/25, especially in epigastric area. She denies any changes to medications or use of natural supplements. She has been off of Omeprazole as symptoms were temporarily improved. Patient is not diabetic. She states Levsin and Dicyclomine were ineffective for her in the past. She denies any constipation or diarrhea now. Patient reports dietary changes and regular exercise have helped her lose weight.  She denies any stress or anxiety.  3/10/2022  Patient presents for follow up. EGD in 2020 showed gastritis, esophagitis, and small hiatal hernia. Colonoscopy in 2019 showed diverticulosis and small 5mm polyp in the rectum. CT scan of abdomen on 10/28/2021 showed pan colonic diverticulosis and stool in the colon.   Patient is still experiencing constant bloating and gas for the past few weeks. She has been taking Omeprazole and [id number], but Dicyclomine 3 times a day has been ineffective. Hyoscyamine was also ineffective for her in the past. Xifxan improved symptoms temporarily in the past. Patient denies constipation. She has at least once bowel movement per day. Patient admits experiencing stress due to these symptoms. She has been avoiding fried foods, gas producing foods in her diet and exercising more. Patient admits difficulty sleeping as she sleeps propped up and symptoms are preventing her from sleeping.

## 2022-10-31 ENCOUNTER — TELEPHONE ENCOUNTER (OUTPATIENT)
Dept: URBAN - METROPOLITAN AREA CLINIC 115 | Facility: CLINIC | Age: 72
End: 2022-10-31

## 2022-10-31 RX ORDER — DICYCLOMINE HYDROCHLORIDE 10 MG/1
1 TABLET CAPSULE ORAL THREE TIMES A DAY
Qty: 90 | Refills: 1
Start: 2021-12-29 | End: 2022-12-30

## 2022-11-01 ENCOUNTER — ERX REFILL RESPONSE (OUTPATIENT)
Dept: URBAN - METROPOLITAN AREA CLINIC 82 | Facility: CLINIC | Age: 72
End: 2022-11-01

## 2022-11-01 ENCOUNTER — TELEPHONE ENCOUNTER (OUTPATIENT)
Dept: URBAN - METROPOLITAN AREA CLINIC 82 | Facility: CLINIC | Age: 72
End: 2022-11-01

## 2022-11-01 RX ORDER — DICYCLOMINE HYDROCHLORIDE 10 MG/1
TAKE 1 CAPSULE BY MOUTH THREE TIMES DAILY CAPSULE ORAL
Qty: 90 CAPSULE | Refills: 0 | OUTPATIENT

## 2022-11-01 RX ORDER — DICYCLOMINE HYDROCHLORIDE 10 MG/1
1 TABLET CAPSULE ORAL THREE TIMES A DAY
Qty: 90 | Refills: 1 | OUTPATIENT

## 2023-03-15 ENCOUNTER — TELEPHONE ENCOUNTER (OUTPATIENT)
Dept: URBAN - METROPOLITAN AREA CLINIC 82 | Facility: CLINIC | Age: 73
End: 2023-03-15

## 2023-09-20 ENCOUNTER — APPOINTMENT (RX ONLY)
Dept: URBAN - METROPOLITAN AREA CLINIC 45 | Facility: CLINIC | Age: 73
Setting detail: DERMATOLOGY
End: 2023-09-20

## 2023-09-20 DIAGNOSIS — L72.8 OTHER FOLLICULAR CYSTS OF THE SKIN AND SUBCUTANEOUS TISSUE: ICD-10-CM

## 2023-09-20 DIAGNOSIS — L82.1 OTHER SEBORRHEIC KERATOSIS: ICD-10-CM

## 2023-09-20 DIAGNOSIS — L91.8 OTHER HYPERTROPHIC DISORDERS OF THE SKIN: ICD-10-CM

## 2023-09-20 PROBLEM — D48.5 NEOPLASM OF UNCERTAIN BEHAVIOR OF SKIN: Status: ACTIVE | Noted: 2023-09-20

## 2023-09-20 PROBLEM — D23.71 OTHER BENIGN NEOPLASM OF SKIN OF RIGHT LOWER LIMB, INCLUDING HIP: Status: ACTIVE | Noted: 2023-09-20

## 2023-09-20 PROCEDURE — ? BIOPSY BY SHAVE METHOD

## 2023-09-20 PROCEDURE — ? BENIGN DESTRUCTION

## 2023-09-20 PROCEDURE — ? COUNSELING

## 2023-09-20 PROCEDURE — 11102 TANGNTL BX SKIN SINGLE LES: CPT | Mod: 59

## 2023-09-20 PROCEDURE — 99202 OFFICE O/P NEW SF 15 MIN: CPT | Mod: 25

## 2023-09-20 PROCEDURE — 17110 DESTRUCTION B9 LES UP TO 14: CPT

## 2023-09-20 ASSESSMENT — LOCATION SIMPLE DESCRIPTION DERM
LOCATION SIMPLE: RIGHT ANTERIOR NECK
LOCATION SIMPLE: LEFT FOOT
LOCATION SIMPLE: LEFT BUTTOCK
LOCATION SIMPLE: RIGHT SHOULDER

## 2023-09-20 ASSESSMENT — LOCATION ZONE DERM
LOCATION ZONE: NECK
LOCATION ZONE: TRUNK
LOCATION ZONE: ARM
LOCATION ZONE: FEET

## 2023-09-20 ASSESSMENT — LOCATION DETAILED DESCRIPTION DERM
LOCATION DETAILED: RIGHT CLAVICULAR NECK
LOCATION DETAILED: LEFT MEDIAL DORSAL FOOT
LOCATION DETAILED: LEFT MEDIAL BUTTOCK
LOCATION DETAILED: RIGHT ANTERIOR SHOULDER

## 2023-09-20 NOTE — HPI: SKIN LESION
What Type Of Note Output Would You Prefer (Optional)?: Bullet Format
How Severe Is Your Skin Lesion?: mild
Has Your Skin Lesion Been Treated?: not been treated
Is This A New Presentation, Or A Follow-Up?: Skin Lesion
Additional History: Patient has a skin lesion on her right foot. She would like a valuated as well as one by her rectum area.\\nNew patient to Dr. Gamboa

## 2023-09-20 NOTE — PROCEDURE: BENIGN DESTRUCTION
Anesthesia Volume In Cc: 0.5
Detail Level: Detailed
Medical Necessity Information: It is in your best interest to select a reason for this procedure from the list below. All of these items fulfill various CMS LCD requirements except the new and changing color options.
Treatment Number (Will Not Render If 0): 0
Consent: The patient's consent was obtained including but not limited to risks of crusting, scabbing, blistering, scarring, darker or lighter pigmentary change, recurrence, incomplete removal and infection.
Add 52 Modifier (Optional): no
Medical Necessity Clause: This procedure was medically necessary because the lesions that were treated were:
Post-Care Instructions: I reviewed with the patient in detail post-care instructions. Patient is to wear sunprotection, and avoid picking at any of the treated lesions. Pt may apply Vaseline to crusted or scabbing areas.